# Patient Record
Sex: FEMALE | HISPANIC OR LATINO
[De-identification: names, ages, dates, MRNs, and addresses within clinical notes are randomized per-mention and may not be internally consistent; named-entity substitution may affect disease eponyms.]

---

## 2024-07-01 ENCOUNTER — DASHBOARD ENCOUNTERS (OUTPATIENT)
Age: 54
End: 2024-07-01

## 2024-07-01 ENCOUNTER — OFFICE VISIT (OUTPATIENT)
Dept: URBAN - METROPOLITAN AREA CLINIC 63 | Facility: CLINIC | Age: 54
End: 2024-07-01
Payer: COMMERCIAL

## 2024-07-01 ENCOUNTER — TELEPHONE ENCOUNTER (OUTPATIENT)
Dept: URBAN - METROPOLITAN AREA CLINIC 63 | Facility: CLINIC | Age: 54
End: 2024-07-01

## 2024-07-01 VITALS
HEIGHT: 66 IN | BODY MASS INDEX: 30.53 KG/M2 | HEART RATE: 82 BPM | TEMPERATURE: 98.9 F | WEIGHT: 190 LBS | DIASTOLIC BLOOD PRESSURE: 80 MMHG | OXYGEN SATURATION: 98 % | SYSTOLIC BLOOD PRESSURE: 120 MMHG

## 2024-07-01 DIAGNOSIS — R10.9 ABDOMINAL CRAMPING: ICD-10-CM

## 2024-07-01 DIAGNOSIS — R19.7 BLOODY DIARRHEA: ICD-10-CM

## 2024-07-01 DIAGNOSIS — R19.4 CHANGE IN BOWEL HABITS: ICD-10-CM

## 2024-07-01 PROCEDURE — 99203 OFFICE O/P NEW LOW 30 MIN: CPT

## 2024-07-01 RX ORDER — ONDANSETRON HYDROCHLORIDE 4 MG/1
1 TABLET TABLET, FILM COATED ORAL
Qty: 2 | Refills: 0 | OUTPATIENT
Start: 2024-07-01

## 2024-07-01 RX ORDER — DICYCLOMINE HYDROCHLORIDE 10 MG/1
1 CAPSULE 30 MINUTES BEFORE EATING CAPSULE ORAL THREE TIMES A DAY
Qty: 90 | Refills: 2

## 2024-07-01 RX ORDER — SODIUM, POTASSIUM,MAG SULFATES 17.5-3.13G
177ML SOLUTION, RECONSTITUTED, ORAL ORAL
Qty: 1 KIT | Refills: 0 | OUTPATIENT
Start: 2024-07-01 | End: 2024-07-02

## 2024-07-01 RX ORDER — LISINOPRIL 10 MG/1
TABLET ORAL
Qty: 90 TABLET | Status: ACTIVE | COMMUNITY

## 2024-07-01 RX ORDER — DICYCLOMINE HYDROCHLORIDE 10 MG/1
CAPSULE ORAL
Qty: 90 CAPSULE | Status: ACTIVE | COMMUNITY

## 2024-07-01 RX ORDER — LEVOTHYROXINE, LIOTHYRONINE 19; 4.5 UG/1; UG/1
TAKE 1 TABLET BY MOUTH DAILY TABLET ORAL
Qty: 90 EACH | Refills: 0 | Status: ACTIVE | COMMUNITY

## 2024-07-01 NOTE — HPI-TODAY'S VISIT:
Patient is a 54-year-old female who presents to the office today with complaints of abdominal cramping and bloody diarrhea. Patient states that while she was in [place] she had a colonoscopy about 4 and half years ago and was told her IBS had progressed to IBD. She has had about 6 colonoscopies over the past 15 years and has never had a specific diagnosis of Crohn's disease or ulcerative colitis. She states that she has alternated between constipation and diarrhea in the past. This is associated with abdominal bloating, nausea and vomiting however typically she takes Bentyl with significant improvement in her symptoms. She occasionally takes Zofran for the nausea and vomiting however denies dysphasia, acid reflux symptoms and epigastric pain. She states that about 4 months ago her symptoms of abdominal pain and bloody diarrhea have worsened. The Bentyl is no longer helping with symptoms however she has made some significant dietary changes that have relieved her symptoms somewhat. She states that she has about 4 bowel movements of liquid stool with some blood per day. She is unsure if she has any family history of IBD but states her mother has had GI issues all of her life and  of a small bowel obstruction. She has never required steroids or admission to the hospital for her symptoms.  Patient denies dysphagia, acid reflux, change in appetite, constipation, hematemesis, melena and unintentional weight loss/gain.  She also denies history of stroke, heart attack, pacemaker, defibrillator, stents, blood thinners, COPD, asthma, NEELAM, chronic kidney disease and seizures.

## 2024-08-15 ENCOUNTER — CLAIMS CREATED FROM THE CLAIM WINDOW (OUTPATIENT)
Dept: URBAN - METROPOLITAN AREA SURGERY CENTER 4 | Facility: SURGERY CENTER | Age: 54
End: 2024-08-15
Payer: COMMERCIAL

## 2024-08-15 DIAGNOSIS — Z12.11 ENCOUNTER FOR SCREENING FOR MALIGNANT NEOPLASM OF COLON: ICD-10-CM

## 2024-08-15 DIAGNOSIS — Z12.11 COLON CANCER SCREENING: ICD-10-CM

## 2024-08-15 DIAGNOSIS — K64.0 FIRST DEGREE HEMORRHOIDS: ICD-10-CM

## 2024-08-15 PROCEDURE — 00812 ANES LWR INTST SCR COLSC: CPT | Performed by: NURSE ANESTHETIST, CERTIFIED REGISTERED

## 2024-08-15 PROCEDURE — G0121 COLON CA SCRN NOT HI RSK IND: HCPCS | Performed by: INTERNAL MEDICINE

## 2024-08-15 RX ORDER — LEVOTHYROXINE, LIOTHYRONINE 19; 4.5 UG/1; UG/1
TAKE 1 TABLET BY MOUTH DAILY TABLET ORAL
Qty: 90 EACH | Refills: 0 | Status: ACTIVE | COMMUNITY

## 2024-08-15 RX ORDER — DICYCLOMINE HYDROCHLORIDE 10 MG/1
1 CAPSULE 30 MINUTES BEFORE EATING CAPSULE ORAL THREE TIMES A DAY
Qty: 90 | Refills: 2 | Status: ACTIVE | COMMUNITY

## 2024-08-15 RX ORDER — LISINOPRIL 10 MG/1
TABLET ORAL
Qty: 90 TABLET | Status: ACTIVE | COMMUNITY

## 2024-08-15 RX ORDER — ONDANSETRON HYDROCHLORIDE 4 MG/1
1 TABLET TABLET, FILM COATED ORAL
Qty: 2 | Refills: 0 | Status: ACTIVE | COMMUNITY
Start: 2024-07-01

## 2024-08-29 ENCOUNTER — OFFICE VISIT (OUTPATIENT)
Dept: URBAN - METROPOLITAN AREA CLINIC 60 | Facility: CLINIC | Age: 54
End: 2024-08-29

## 2024-08-30 ENCOUNTER — TELEPHONE ENCOUNTER (OUTPATIENT)
Dept: URBAN - METROPOLITAN AREA CLINIC 64 | Facility: CLINIC | Age: 54
End: 2024-08-30

## 2024-09-05 ENCOUNTER — OFFICE VISIT (OUTPATIENT)
Dept: URBAN - METROPOLITAN AREA CLINIC 60 | Facility: CLINIC | Age: 54
End: 2024-09-05

## 2024-09-19 ENCOUNTER — OFFICE VISIT (OUTPATIENT)
Dept: URBAN - METROPOLITAN AREA CLINIC 60 | Facility: CLINIC | Age: 54
End: 2024-09-19

## 2024-10-16 PROBLEM — 197125005: Status: ACTIVE | Noted: 2024-10-16

## 2024-10-18 ENCOUNTER — OFFICE VISIT (OUTPATIENT)
Dept: URBAN - METROPOLITAN AREA CLINIC 60 | Facility: CLINIC | Age: 54
End: 2024-10-18

## 2024-10-18 NOTE — HPI-TODAY'S VISIT:
LOV 2024 Review colonoscopy-no evidence of IBD Stool studies not completed Bentyl 3 times a day improved symptoms?    LOV Patient is a 54-year-old female who presents to the office today with complaints of abdominal cramping and bloody diarrhea. Patient states that while she was in [place] she had a colonoscopy about 4 and half years ago and was told her IBS had progressed to IBD. She has had about 6 colonoscopies over the past 15 years and has never had a specific diagnosis of Crohn's disease or ulcerative colitis. She states that she has alternated between constipation and diarrhea in the past. This is associated with abdominal bloating, nausea and vomiting however typically she takes Bentyl with significant improvement in her symptoms. She occasionally takes Zofran for the nausea and vomiting however denies dysphasia, acid reflux symptoms and epigastric pain. She states that about 4 months ago her symptoms of abdominal pain and bloody diarrhea have worsened. The Bentyl is no longer helping with symptoms however she has made some significant dietary changes that have relieved her symptoms somewhat. She states that she has about 4 bowel movements of liquid stool with some blood per day. She is unsure if she has any family history of IBD but states her mother has had GI issues all of her life and  of a small bowel obstruction. She has never required steroids or admission to the hospital for her symptoms.  Patient denies dysphagia, acid reflux, change in appetite, constipation, hematemesis, melena and unintentional weight loss/gain.  She also denies history of stroke, heart attack, pacemaker, defibrillator, stents, blood thinners, COPD, asthma, NEELAM, chronic kidney disease and seizures.    . Colonoscopy 8/15/2024 - Nonbleeding internal hemorrhoids - Examined portion of ileum was normal, biopsied - Entire examined colon is normal - Pathology: Terminal ileum biopsy-small bowel mucosa with normal villous pattern and benign lymphoid nodules without specific histopathological findings; random colon biopsy-colonic mucosa without specific histopathological findings - Repeat colonoscopy in 10 years

## 2024-10-23 ENCOUNTER — OFFICE VISIT (OUTPATIENT)
Dept: URBAN - METROPOLITAN AREA CLINIC 60 | Facility: CLINIC | Age: 54
End: 2024-10-23

## 2024-10-23 RX ORDER — LEVOTHYROXINE, LIOTHYRONINE 19; 4.5 UG/1; UG/1
TAKE 1 TABLET BY MOUTH DAILY TABLET ORAL
Qty: 90 EACH | Refills: 0 | Status: ACTIVE | COMMUNITY

## 2024-10-23 RX ORDER — DICYCLOMINE HYDROCHLORIDE 10 MG/1
1 CAPSULE 30 MINUTES BEFORE EATING CAPSULE ORAL THREE TIMES A DAY
Qty: 90 | Refills: 2 | Status: ACTIVE | COMMUNITY

## 2024-10-23 RX ORDER — LISINOPRIL 10 MG/1
TABLET ORAL
Qty: 90 TABLET | Status: ACTIVE | COMMUNITY

## 2024-10-23 RX ORDER — ONDANSETRON HYDROCHLORIDE 4 MG/1
1 TABLET TABLET, FILM COATED ORAL
Qty: 2 | Refills: 0 | Status: ACTIVE | COMMUNITY
Start: 2024-07-01

## 2025-01-22 ENCOUNTER — ERX REFILL RESPONSE (OUTPATIENT)
Dept: URBAN - METROPOLITAN AREA CLINIC 63 | Facility: CLINIC | Age: 55
End: 2025-01-22

## 2025-01-22 RX ORDER — DICYCLOMINE HYDROCHLORIDE 10 MG/1
TAKE 1 CAPSULE BY MOUTH THREE TIMES DAILY 30 MINUTES BEFORE EATING CAPSULE ORAL
Qty: 90 CAPSULE | Refills: 0 | OUTPATIENT

## 2025-01-22 RX ORDER — DICYCLOMINE HYDROCHLORIDE 10 MG/1
1 CAPSULE 30 MINUTES BEFORE EATING CAPSULE ORAL THREE TIMES A DAY
Qty: 90 | Refills: 2 | OUTPATIENT

## 2025-05-09 ENCOUNTER — P2P PATIENT RECORD (OUTPATIENT)
Age: 55
End: 2025-05-09

## 2025-05-12 ENCOUNTER — TELEPHONE ENCOUNTER (OUTPATIENT)
Dept: URBAN - METROPOLITAN AREA CLINIC 60 | Facility: CLINIC | Age: 55
End: 2025-05-12

## 2025-05-28 ENCOUNTER — LAB OUTSIDE AN ENCOUNTER (OUTPATIENT)
Dept: URBAN - METROPOLITAN AREA CLINIC 63 | Facility: CLINIC | Age: 55
End: 2025-05-28

## 2025-05-28 ENCOUNTER — OFFICE VISIT (OUTPATIENT)
Dept: URBAN - METROPOLITAN AREA CLINIC 63 | Facility: CLINIC | Age: 55
End: 2025-05-28

## 2025-05-28 ENCOUNTER — OFFICE VISIT (OUTPATIENT)
Dept: URBAN - METROPOLITAN AREA CLINIC 63 | Facility: CLINIC | Age: 55
End: 2025-05-28
Payer: COMMERCIAL

## 2025-05-28 DIAGNOSIS — R19.7 ACUTE DIARRHEA: ICD-10-CM

## 2025-05-28 DIAGNOSIS — R10.13 EPIGASTRIC PAIN: ICD-10-CM

## 2025-05-28 DIAGNOSIS — K58.0 IRRITABLE BOWEL SYNDROME WITH DIARRHEA: ICD-10-CM

## 2025-05-28 DIAGNOSIS — K21.9 GERD WITHOUT ESOPHAGITIS: ICD-10-CM

## 2025-05-28 PROBLEM — 266435005: Status: ACTIVE | Noted: 2025-05-28

## 2025-05-28 PROCEDURE — 99214 OFFICE O/P EST MOD 30 MIN: CPT

## 2025-05-28 RX ORDER — CHOLECALCIFEROL TAB 50 MCG (2000 UNIT) 50 MCG
ONCE PER WEEK TAB ORAL
Status: ACTIVE | COMMUNITY

## 2025-05-28 RX ORDER — LISINOPRIL 10 MG/1
TABLET ORAL
Qty: 90 TABLET | Status: ACTIVE | COMMUNITY

## 2025-05-28 RX ORDER — LEVOTHYROXINE, LIOTHYRONINE 19; 4.5 UG/1; UG/1
TAKE 1 TABLET BY MOUTH DAILY TABLET ORAL
Qty: 90 EACH | Refills: 0 | Status: ACTIVE | COMMUNITY

## 2025-05-28 RX ORDER — ELUXADOLINE 75 MG/1
1 TABLET WITH FOOD TABLET, FILM COATED ORAL TWICE A DAY
Qty: 180 TABLET | Refills: 3 | OUTPATIENT
Start: 2025-05-28 | End: 2026-05-23

## 2025-05-28 RX ORDER — DICYCLOMINE HYDROCHLORIDE 10 MG/1
TAKE 1 CAPSULE BY MOUTH THREE TIMES DAILY 30 MINUTES BEFORE EATING CAPSULE ORAL
Qty: 90 CAPSULE | Refills: 0 | Status: ACTIVE | COMMUNITY

## 2025-05-28 RX ORDER — MULTIVITAMIN WITH IRON
AS DIRECTED TABLET ORAL
Status: ACTIVE | COMMUNITY

## 2025-05-28 RX ORDER — MAGNESIUM OXIDE/MAG AA CHELATE 300 MG
1 CAPSULE WITH A MEAL CAPSULE ORAL ONCE A DAY
Status: ACTIVE | COMMUNITY

## 2025-05-28 NOTE — HPI-ZZZTODAY'S VISIT
Patient is a very pleasant 55-year-old female who presents for IBS.  She is a patient of Dr. Sweeney.  Last seen in office by Ann Marie Arnold PA-C 2024.  Past medical history significant for hypertension, IBS.  Past surgical history reviewed.  Last colonoscopy 8/15/2024.  Family history noncontributory. Previously: Patient is a 54-year-old female who presents to the office today with complaints of abdominal cramping and bloody diarrhea. Patient states that while she was in [place] she had a colonoscopy about 4 and half years ago and was told her IBS had progressed to IBD. She has had about 6 colonoscopies over the past 15 years and has never had a specific diagnosis of Crohn's disease or ulcerative colitis. She states that she has alternated between constipation and diarrhea in the past. This is associated with abdominal bloating, nausea and vomiting however typically she takes Bentyl with significant improvement in her symptoms. She occasionally takes Zofran for the nausea and vomiting however denies dysphasia, acid reflux symptoms and epigastric pain. She states that about 4 months ago her symptoms of abdominal pain and bloody diarrhea have worsened. The Bentyl is no longer helping with symptoms however she has made some significant dietary changes that have relieved her symptoms somewhat. She states that she has about 4 bowel movements of liquid stool with some blood per day. She is unsure if she has any family history of IBD but states her mother has had GI issues all of her life and  of a small bowel obstruction. She has never required steroids or admission to the hospital for her symptoms. Patient denies dysphagia, acid reflux, change in appetite, constipation, hematemesis, melena and unintentional weight loss/gain. She also denies history of stroke, heart attack, pacemaker, defibrillator, stents, blood thinners, COPD, asthma, NEELAM, chronic kidney disease and seizures.  TODAY 2025: Patient presents for follow up of diarrhea and abdominal cramping. She is feeling as though she cannot eat anything anymore without having loose stools.  She has postprandial diarrhea almost daily.  She has completely eliminated both gluten and dairy and remains gluten and dairy free.  She is complaining of epigastric pain and discomfort with increase in acid reflux.  When questioned about PPI use she states that all PPIs that she has tried in the past made it worse.  She does not wish to go on any antacid regimen at this point but is concerned that she has an ulcer.  She denies dysphagia, unintentional weight loss, melena, hematochezia.

## 2025-05-28 NOTE — HPI-PREVIOUS PROCEDURES
Colonoscopy 8/15/2024 consistent with nonbleeding internal hemorrhoids ileum within normal limits and entire examined colon normal Recall for 10 years Pathology of ileum with benign lymphoid nodules without histopathologic changes and random colon biopsies within normal limits

## 2025-05-28 NOTE — HPI-PREVIOUS LABS
2/20/2025 TPO elevated [facility] thyroglobulin antibody 2/2/2024 CBC within normal limits, PT INR without abnormality, D-dimer mildly elevated at 1.91, CMP with BUN of 18, total protein 8.3, ALT mildly elevated at 37 (51), AST of 31, (41)

## 2025-05-28 NOTE — HPI-PREVIOUS IMAGING
2/2/2024 CT abdomen pelvis with no evidence of acute process Subcentimeter Demeter right hepatic lobe hypodensity too small to characterize otherwise normal liver 2/2/2024 right upper quadrant ultrasound consistent with no acute abnormality and hepatic steatosis.

## 2025-08-19 ENCOUNTER — TELEPHONE ENCOUNTER (OUTPATIENT)
Dept: URBAN - METROPOLITAN AREA CLINIC 63 | Facility: CLINIC | Age: 55
End: 2025-08-19

## 2025-08-22 ENCOUNTER — TELEPHONE ENCOUNTER (OUTPATIENT)
Dept: URBAN - METROPOLITAN AREA CLINIC 63 | Facility: CLINIC | Age: 55
End: 2025-08-22

## 2025-08-29 ENCOUNTER — OFFICE VISIT (OUTPATIENT)
Dept: URBAN - METROPOLITAN AREA SURGERY CENTER 4 | Facility: SURGERY CENTER | Age: 55
End: 2025-08-29